# Patient Record
Sex: FEMALE | Race: WHITE | Employment: FULL TIME | URBAN - NONMETROPOLITAN AREA
[De-identification: names, ages, dates, MRNs, and addresses within clinical notes are randomized per-mention and may not be internally consistent; named-entity substitution may affect disease eponyms.]

---

## 2017-11-02 ENCOUNTER — OFFICE VISIT (OUTPATIENT)
Dept: PODIATRY | Age: 62
End: 2017-11-02
Payer: COMMERCIAL

## 2017-11-02 VITALS
WEIGHT: 279 LBS | BODY MASS INDEX: 42.28 KG/M2 | HEIGHT: 68 IN | SYSTOLIC BLOOD PRESSURE: 120 MMHG | HEART RATE: 72 BPM | DIASTOLIC BLOOD PRESSURE: 72 MMHG

## 2017-11-02 DIAGNOSIS — M24.573 EQUINUS CONTRACTURE OF ANKLE: ICD-10-CM

## 2017-11-02 DIAGNOSIS — M76.61 ACHILLES TENDINITIS OF RIGHT LOWER EXTREMITY: Primary | ICD-10-CM

## 2017-11-02 DIAGNOSIS — M77.31 CALCANEAL SPUR OF FOOT, RIGHT: ICD-10-CM

## 2017-11-02 PROCEDURE — 99202 OFFICE O/P NEW SF 15 MIN: CPT | Performed by: PODIATRIST

## 2017-11-02 RX ORDER — ATORVASTATIN CALCIUM 10 MG/1
10 TABLET, FILM COATED ORAL
COMMUNITY

## 2017-11-02 RX ORDER — HYDROCODONE BITARTRATE AND ACETAMINOPHEN 5; 325 MG/1; MG/1
1-2 TABLET ORAL
COMMUNITY
Start: 2017-10-27

## 2017-11-02 RX ORDER — LISINOPRIL 20 MG/1
10 TABLET ORAL
COMMUNITY

## 2017-11-02 RX ORDER — HYDROCHLOROTHIAZIDE 25 MG/1
25 TABLET ORAL
COMMUNITY

## 2017-11-02 NOTE — LETTER
[facility]  Berny Garcia  Phone: [phone]  Fax: [phone], DPM        November 2, 2017     Patient: Ishmael Main   YOB: 1955   Date of Visit: 11/2/2017       To Whom It May Concern: It is my medical opinion that Ishmael Main should wear off loading boot at all times weight bearing including while at work over the next month. If you have any questions or concerns, please don't hesitate to call.     Sincerely,        Thomas Travis DPM

## 2017-11-02 NOTE — PATIENT INSTRUCTIONS
Achilles tendonitis Treatment    1. Stretching - 3 times per day minimum for 5-10 minutes. Include stretching against a wall or counter with one foot back and heel flat,    Leaning into a step, or using a towel or belt. Be active and aggressive with your stretching for maximum benefit. 2.  Ice - 3 times per day. 3.  Anti-inflammatory - take OTC or Rx as ordered. 4.  Arch support and supportive shoe gear. Wear orthotics or prefabricated arch supports at ALL times in supportive shoegear. Do NOT go barefoot.

## 2017-11-02 NOTE — PROGRESS NOTES
DP and PT pulses palpable 2/4, bilateral.  CFT <3 seconds, bilateral.  Hair growth present to the level of the digits, bilateral.  Edema absent, bilateral.  Varicosities absent, bilateral. Erythema absent, bilateral. Distal Rubor absent bilateral.  Temperature within normal limits bilateral. Hyperpigmentation absent bilateral. No atrophic skin. Neurological: Sensation intact to light touch to level of digits, bilateral.  Protective sensation intact 10/10 sites via 5.07/10g [employer] [place] Monofilament, bilateral.  negative Tinel's, bilateral.  negative Valleix sign, bilateral.  Vibratory intact bilateral.  Reflexes Decreased bilateral.  Paresthesias negative. Dysthesias negative. Sharp/dull intact bilateral.    Musculoskeletal: Muscle strength 5/5, Bilateral.  Pain with strength testing is absent. Pain present upon palpation of distal achilles insertion, Right.  within normal limits medial longitudinal arch, Bilateral.  Ankle ROM decreased,Bilateral.  1st MPJ ROM within normal limits, Bilateral.  Dorsally contracted digits absent digits none, Bilateral. Other foot deformities none. Integument: Warm, dry, supple, bilateral.  Open lesion absent, bilateral.  Interdigital maceration absent to web spaces bilateral.  Nails within normal limits. Fissures absent, bilateral. Hyperkeratotic tissue is absent. Asessment: Patient is a 63 y.o. female with:   1. Achilles tendinitis of right lower extremity     2. Equinus contracture of ankle     3. Calcaneal spur of foot, right         Plan: Patient examined and evaluated. Current condition and treatment options discussed in detail. Achilles tendonitis Treatment    1. Stretching - 3 times per day minimum for 5-10 minutes. Include stretching against a wall or counter with one foot back and heel flat,    Leaning into a step, or using a towel or belt. Be active and aggressive with your stretching for maximum benefit. 2.  Ice - 3 times per day.      3. Anti-inflammatory - take OTC or Rx as ordered. 4.  Arch support and supportive shoe gear. Wear orthotics or prefabricated arch supports at ALL times in supportive shoegear. Do NOT go barefoot. Orders Placed This Encounter   Medications    Elastic Bandages & Supports (ANKLE SPLINT/NIGHT AIRFORM) MISC     Si Device by Does not apply route every evening Achilles tendinitis of right lower extremity  (primary encounter diagnosis)  Equinus contracture of ankle  calcaneal spur of foot, right      Dispense posterior night splint. Dispense:  1 each     Refill:  0     X rays reviewed with the pt in detail. All questions answered. Pt to use cam walker at work for next 2 weeks, note given for work to allow to wear the boot. Contact office with any questions/problems/concerns. RTC in 3week(s).